# Patient Record
Sex: MALE | Race: WHITE | NOT HISPANIC OR LATINO | Employment: OTHER | URBAN - METROPOLITAN AREA
[De-identification: names, ages, dates, MRNs, and addresses within clinical notes are randomized per-mention and may not be internally consistent; named-entity substitution may affect disease eponyms.]

---

## 2022-11-24 ENCOUNTER — HOSPITAL ENCOUNTER (EMERGENCY)
Facility: HOSPITAL | Age: 67
Discharge: HOME | End: 2022-11-24
Attending: EMERGENCY MEDICINE
Payer: MEDICARE

## 2022-11-24 VITALS
SYSTOLIC BLOOD PRESSURE: 171 MMHG | RESPIRATION RATE: 19 BRPM | HEART RATE: 72 BPM | OXYGEN SATURATION: 95 % | WEIGHT: 249 LBS | TEMPERATURE: 98 F | HEIGHT: 65 IN | DIASTOLIC BLOOD PRESSURE: 81 MMHG | BODY MASS INDEX: 41.48 KG/M2

## 2022-11-24 DIAGNOSIS — S00.03XA CONTUSION OF SCALP, INITIAL ENCOUNTER: ICD-10-CM
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
## 2022-11-24 DIAGNOSIS — S01.311A LACERATION OF AURICLE OF RIGHT EAR, INITIAL ENCOUNTER: ICD-10-CM

## 2022-11-24 PROCEDURE — 90471 IMMUNIZATION ADMIN: CPT | Performed by: EMERGENCY MEDICINE

## 2022-11-24 PROCEDURE — 99285 EMERGENCY DEPT VISIT HI MDM: CPT | Mod: 25

## 2022-11-24 PROCEDURE — 63600175 PHARM REV CODE 636 W HCPCS: Performed by: EMERGENCY MEDICINE

## 2022-11-24 PROCEDURE — 90715 TDAP VACCINE 7 YRS/> IM: CPT | Performed by: EMERGENCY MEDICINE

## 2022-11-24 RX ADMIN — TETANUS TOXOID, REDUCED DIPHTHERIA TOXOID AND ACELLULAR PERTUSSIS VACCINE, ADSORBED 0.5 ML: 5; 2.5; 8; 8; 2.5 SUSPENSION INTRAMUSCULAR at 01:11

## 2022-11-24 NOTE — ED PROVIDER NOTES
"Encounter Date: 11/23/2022    SCRIBE #1 NOTE: IMary, am scribing for, and in the presence of,  Scott Ferris MD. I have scribed the following portions of the note - Other sections scribed: HPI, ROS, PE.   SCRIBE #2 NOTE: I, Criselda Osborn, am scribing for, and in the presence of,  Scott Ferris MD. I have scribed the following portions of the note - Other sections scribed: HPI, ROS, PE.   History     Chief Complaint   Patient presents with    Fall     Pt presents to ED c/o fall that occurred tonight around 2140.  Pt reports hitting his head and shattering a glass table.  Pt c/o neck pain, headache and laceration to the right ear.  Pt reports taking blood thinner. Denies loc, weakness, dizziness, n/v or any other symptoms.  Pain 5/10.       66 y.o male patient with no pertinent PMHx, presents to the ED with a chief complaint of fall onset about four hours ago. Patient states that he fell and hit his head. He further states that he shattered a glass table secondary to the fall and has "cuts and glass all over". Patient reports having a "knot behind his right ear" and pain and "red marks" to his buttocks. Denies any LOC. He notes that he is not UTD with his tetanus vaccination. No other exacerbating or alleviating factors. Denies any abdominal pain or other associated symptoms.     The history is provided by the patient. No  was used.   Review of patient's allergies indicates:  No Known Allergies  No past medical history on file.  No past surgical history on file.  No family history on file.     Review of Systems   Constitutional: Negative.    HENT: Negative.          (+) "Knot" behind right ear     Eyes: Negative.    Respiratory: Negative.     Cardiovascular: Negative.    Gastrointestinal: Negative.  Negative for abdominal pain.   Endocrine: Negative.    Genitourinary: Negative.    Musculoskeletal:  Positive for myalgias.   Skin:  Positive for wound.   Allergic/Immunologic: " Negative.    Neurological:  Negative for syncope.   Hematological: Negative.    Psychiatric/Behavioral: Negative.       Physical Exam     Initial Vitals [11/24/22 0000]   BP Pulse Resp Temp SpO2   (!) 172/97 70 18 98.4 °F (36.9 °C) 95 %      MAP       --         Physical Exam    Nursing note and vitals reviewed.  Constitutional: Vital signs are normal. He appears well-developed. He is active and cooperative.   HENT:   Head: Normocephalic and atraumatic.   Right Ear: Tympanic membrane normal. Right ear exhibits lacerations (Present to the right external ear canal at the 11 o' clock position). A foreign body (Slither of glass present inside ear canal) is present.   Dry blood present to right ear canal.    Eyes: Conjunctivae, EOM and lids are normal. Pupils are equal, round, and reactive to light.   Neck: Trachea normal. Neck supple. No thyroid mass present.    Full passive range of motion without pain.     Cardiovascular:  Normal rate, regular rhythm, S1 normal, S2 normal, normal heart sounds, intact distal pulses and normal pulses.           Abdominal: Abdomen is soft. Bowel sounds are normal.   Musculoskeletal:         General: Normal range of motion.      Cervical back: Full passive range of motion without pain and neck supple.     Lymphadenopathy:     He has no axillary adenopathy.   Neurological: He is alert and oriented to person, place, and time. He has normal strength. He displays normal reflexes. No cranial nerve deficit or sensory deficit. GCS eye subscore is 4. GCS verbal subscore is 5. GCS motor subscore is 6.   Skin: Skin is warm and dry. Abrasion (Superficial abrasion present to lower chest wall) noted.   Psychiatric: He has a normal mood and affect. His speech is normal and behavior is normal. Judgment and thought content normal. Cognition and memory are normal.       ED Course   Procedures  Labs Reviewed - No data to display       Imaging Results              CT Head Without Contrast (Final result)   Result time 11/24/22 01:04:21      Final result by Katherine Grayson MD (11/24/22 01:04:21)                   Impression:      No acute intracranial hemorrhage detected.  Remote right subinsular infarct.  Age-indeterminate right thalamic lacunar infarct.      Electronically signed by: Katherine Grayson  Date:    11/24/2022  Time:    01:04               Narrative:    EXAMINATION:  CT OF THE HEAD WITHOUT    CLINICAL HISTORY:  Facial trauma, blunt;    TECHNIQUE:  5 mm unenhanced axial images were obtained from the skull base to the vertex.    COMPARISON:  None.    FINDINGS:  The ventricles, basal cisterns, and cortical sulci are within normal limits for patient's stated age. There is no acute intracranial hemorrhage, territorial infarct or mass effect, or midline shift.  There is remote right subinsular infarct.  Associated ex vacuole dilatation is exerted on the right lateral ventricle.  There is also an age-indeterminate right thalamic lacunar infarct.  The visualized paranasal sinuses and mastoid air cells are clear.                                       Medications   Tdap (BOOSTRIX) vaccine injection 0.5 mL (0.5 mLs Intramuscular Given 11/24/22 0108)     Medical Decision Making:   History:   Old Medical Records: I decided to obtain old medical records.  Clinical Tests:   Radiological Study: Ordered and Reviewed        Scribe Attestation:   Scribe #1: I performed the above scribed service and the documentation accurately describes the services I performed. I attest to the accuracy of the note.  Scribe #2: I performed the above scribed service and the documentation accurately describes the services I performed. I attest to the accuracy of the note.                   Clinical Impression:   Final diagnoses:  [W19.XXXA] Fall, initial encounter (Primary)  [S00.03XA] Contusion of scalp, initial encounter  [S01.311A] Laceration of auricle of right ear, initial encounter   Scribe attestation: I, Scott Ferris MD ,  personally performed the services described in this documentation. All medical record entries made by the scribe were at my direction and in my presence.  I have reviewed the chart and agree that the record reflects my personal performance and is accurate and complete.      ED Disposition Condition    Discharge Stable          ED Prescriptions    None       Follow-up Information       Follow up With Specialties Details Why Contact Info      Schedule an appointment as soon as possible for a visit  As needed              Scott Ferris MD  11/24/22 1847

## 2022-12-20 ENCOUNTER — HOSPITAL ENCOUNTER (EMERGENCY)
Facility: HOSPITAL | Age: 67
Discharge: HOME | End: 2022-12-20
Attending: EMERGENCY MEDICINE
Payer: MEDICARE

## 2022-12-20 VITALS
TEMPERATURE: 99 F | OXYGEN SATURATION: 95 % | BODY MASS INDEX: 41.48 KG/M2 | SYSTOLIC BLOOD PRESSURE: 166 MMHG | HEIGHT: 65 IN | DIASTOLIC BLOOD PRESSURE: 79 MMHG | HEART RATE: 85 BPM | WEIGHT: 249 LBS | RESPIRATION RATE: 27 BRPM

## 2022-12-20 DIAGNOSIS — R05.3 CHRONIC COUGH: ICD-10-CM

## 2022-12-20 DIAGNOSIS — R05.9 COUGH: ICD-10-CM

## 2022-12-20 DIAGNOSIS — J40 BRONCHITIS: Primary | ICD-10-CM

## 2022-12-20 LAB
ALBUMIN SERPL BCP-MCNC: 4.1 G/DL (ref 3.5–5.2)
ALP SERPL-CCNC: 84 U/L (ref 55–135)
ALT SERPL W/O P-5'-P-CCNC: 27 U/L (ref 10–44)
ANION GAP SERPL CALC-SCNC: 10 MMOL/L (ref 8–16)
AST SERPL-CCNC: 19 U/L (ref 10–40)
BASOPHILS # BLD AUTO: 0.05 K/UL (ref 0–0.2)
BASOPHILS NFR BLD: 0.4 % (ref 0–1.9)
BILIRUB SERPL-MCNC: 1.2 MG/DL (ref 0.1–1)
BNP SERPL-MCNC: 25 PG/ML (ref 0–99)
BUN SERPL-MCNC: 8 MG/DL (ref 8–23)
CALCIUM SERPL-MCNC: 9 MG/DL (ref 8.7–10.5)
CHLORIDE SERPL-SCNC: 104 MMOL/L (ref 95–110)
CO2 SERPL-SCNC: 23 MMOL/L (ref 23–29)
CREAT SERPL-MCNC: 0.8 MG/DL (ref 0.5–1.4)
CTP QC/QA: YES
CTP QC/QA: YES
DIFFERENTIAL METHOD: ABNORMAL
EOSINOPHIL # BLD AUTO: 0.3 K/UL (ref 0–0.5)
EOSINOPHIL NFR BLD: 2.1 % (ref 0–8)
ERYTHROCYTE [DISTWIDTH] IN BLOOD BY AUTOMATED COUNT: 12.8 % (ref 11.5–14.5)
EST. GFR  (NO RACE VARIABLE): >60 ML/MIN/1.73 M^2
GLUCOSE SERPL-MCNC: 126 MG/DL (ref 70–110)
HCT VFR BLD AUTO: 42 % (ref 40–54)
HGB BLD-MCNC: 14.4 G/DL (ref 14–18)
IMM GRANULOCYTES # BLD AUTO: 0.05 K/UL (ref 0–0.04)
IMM GRANULOCYTES NFR BLD AUTO: 0.4 % (ref 0–0.5)
LYMPHOCYTES # BLD AUTO: 1.2 K/UL (ref 1–4.8)
LYMPHOCYTES NFR BLD: 8.6 % (ref 18–48)
MCH RBC QN AUTO: 31.1 PG (ref 27–31)
MCHC RBC AUTO-ENTMCNC: 34.3 G/DL (ref 32–36)
MCV RBC AUTO: 91 FL (ref 82–98)
MONOCYTES # BLD AUTO: 1.3 K/UL (ref 0.3–1)
MONOCYTES NFR BLD: 9.7 % (ref 4–15)
NEUTROPHILS # BLD AUTO: 10.6 K/UL (ref 1.8–7.7)
NEUTROPHILS NFR BLD: 78.8 % (ref 38–73)
NRBC BLD-RTO: 0 /100 WBC
PLATELET # BLD AUTO: 227 K/UL (ref 150–450)
PMV BLD AUTO: 10.3 FL (ref 9.2–12.9)
POC MOLECULAR INFLUENZA A AGN: NEGATIVE
POC MOLECULAR INFLUENZA B AGN: NEGATIVE
POTASSIUM SERPL-SCNC: 3.8 MMOL/L (ref 3.5–5.1)
PROT SERPL-MCNC: 7.4 G/DL (ref 6–8.4)
RBC # BLD AUTO: 4.63 M/UL (ref 4.6–6.2)
SARS-COV-2 RDRP RESP QL NAA+PROBE: NEGATIVE
SODIUM SERPL-SCNC: 137 MMOL/L (ref 136–145)
TROPONIN I SERPL DL<=0.01 NG/ML-MCNC: <0.006 NG/ML (ref 0–0.03)
WBC # BLD AUTO: 13.47 K/UL (ref 3.9–12.7)

## 2022-12-20 PROCEDURE — 99285 EMERGENCY DEPT VISIT HI MDM: CPT | Mod: 25

## 2022-12-20 PROCEDURE — 87502 INFLUENZA DNA AMP PROBE: CPT

## 2022-12-20 PROCEDURE — 84484 ASSAY OF TROPONIN QUANT: CPT | Performed by: EMERGENCY MEDICINE

## 2022-12-20 PROCEDURE — 93010 ELECTROCARDIOGRAM REPORT: CPT | Mod: ,,, | Performed by: INTERNAL MEDICINE

## 2022-12-20 PROCEDURE — 93010 EKG 12-LEAD: ICD-10-PCS | Mod: ,,, | Performed by: INTERNAL MEDICINE

## 2022-12-20 PROCEDURE — 85025 COMPLETE CBC W/AUTO DIFF WBC: CPT | Performed by: EMERGENCY MEDICINE

## 2022-12-20 PROCEDURE — 87635 SARS-COV-2 COVID-19 AMP PRB: CPT | Performed by: EMERGENCY MEDICINE

## 2022-12-20 PROCEDURE — 80053 COMPREHEN METABOLIC PANEL: CPT | Performed by: EMERGENCY MEDICINE

## 2022-12-20 PROCEDURE — 25000003 PHARM REV CODE 250: Performed by: EMERGENCY MEDICINE

## 2022-12-20 PROCEDURE — 96374 THER/PROPH/DIAG INJ IV PUSH: CPT

## 2022-12-20 PROCEDURE — 93005 ELECTROCARDIOGRAM TRACING: CPT

## 2022-12-20 PROCEDURE — 25500020 PHARM REV CODE 255: Performed by: EMERGENCY MEDICINE

## 2022-12-20 PROCEDURE — 83880 ASSAY OF NATRIURETIC PEPTIDE: CPT | Performed by: EMERGENCY MEDICINE

## 2022-12-20 RX ORDER — BENZONATATE 100 MG/1
200 CAPSULE ORAL ONCE
Status: COMPLETED | OUTPATIENT
Start: 2022-12-20 | End: 2022-12-20

## 2022-12-20 RX ORDER — LIDOCAINE HYDROCHLORIDE 20 MG/ML
15 SOLUTION OROPHARYNGEAL ONCE
Status: COMPLETED | OUTPATIENT
Start: 2022-12-20 | End: 2022-12-20

## 2022-12-20 RX ORDER — GUAIFENESIN 100 MG/5ML
100-200 SOLUTION ORAL EVERY 4 HOURS PRN
Qty: 60 ML | Refills: 0 | Status: SHIPPED | OUTPATIENT
Start: 2022-12-20 | End: 2022-12-30

## 2022-12-20 RX ORDER — PREDNISONE 50 MG/1
50 TABLET ORAL DAILY
Qty: 5 TABLET | Refills: 0 | Status: SHIPPED | OUTPATIENT
Start: 2022-12-20 | End: 2022-12-25

## 2022-12-20 RX ORDER — ALBUTEROL SULFATE 90 UG/1
1-2 AEROSOL, METERED RESPIRATORY (INHALATION) EVERY 6 HOURS PRN
Qty: 18 G | Refills: 0 | Status: SHIPPED | OUTPATIENT
Start: 2022-12-20 | End: 2023-12-20

## 2022-12-20 RX ORDER — METOPROLOL TARTRATE 1 MG/ML
5 INJECTION, SOLUTION INTRAVENOUS
Status: COMPLETED | OUTPATIENT
Start: 2022-12-20 | End: 2022-12-20

## 2022-12-20 RX ORDER — CETIRIZINE HYDROCHLORIDE 10 MG/1
10 TABLET ORAL DAILY
Qty: 30 TABLET | Refills: 0 | Status: SHIPPED | OUTPATIENT
Start: 2022-12-20 | End: 2023-12-20

## 2022-12-20 RX ORDER — MAG HYDROX/ALUMINUM HYD/SIMETH 200-200-20
30 SUSPENSION, ORAL (FINAL DOSE FORM) ORAL ONCE
Status: COMPLETED | OUTPATIENT
Start: 2022-12-20 | End: 2022-12-20

## 2022-12-20 RX ORDER — AMOXICILLIN AND CLAVULANATE POTASSIUM 875; 125 MG/1; MG/1
1 TABLET, FILM COATED ORAL 2 TIMES DAILY
Qty: 20 TABLET | Refills: 0 | Status: SHIPPED | OUTPATIENT
Start: 2022-12-20

## 2022-12-20 RX ORDER — FAMOTIDINE 20 MG/1
20 TABLET, FILM COATED ORAL 2 TIMES DAILY
Qty: 60 TABLET | Refills: 0 | Status: SHIPPED | OUTPATIENT
Start: 2022-12-20 | End: 2023-12-20

## 2022-12-20 RX ADMIN — LIDOCAINE HYDROCHLORIDE 15 ML: 20 SOLUTION ORAL at 09:12

## 2022-12-20 RX ADMIN — BENZONATATE 200 MG: 100 CAPSULE ORAL at 09:12

## 2022-12-20 RX ADMIN — IOHEXOL 85 ML: 350 INJECTION, SOLUTION INTRAVENOUS at 11:12

## 2022-12-20 RX ADMIN — ALUMINUM HYDROXIDE, MAGNESIUM HYDROXIDE, AND DIMETHICONE 30 ML: 200; 20; 200 SUSPENSION ORAL at 09:12

## 2022-12-20 RX ADMIN — METOPROLOL TARTRATE 5 MG: 1 INJECTION, SOLUTION INTRAVENOUS at 10:12

## 2022-12-20 NOTE — ED TRIAGE NOTES
"Patient reports cough and general fatigue for 6 weeks. Took dose of Doxycycline 3 weeks ago with no improvement. "Barking" cough noted. Placed on cardiac monitor.   "

## 2022-12-20 NOTE — DISCHARGE INSTRUCTIONS
Thank you for coming to our Emergency Department today. It is important to remember that some problems or medical conditions are difficult to diagnose and may not be found or addressed during your Emergency Department visit.     Be sure to follow up with your primary care doctor and review all labs/imaging/tests that were performed during your ER visit with them. Some labs/imaging/tests may be outside of the normal range, and require non-emergent follow-up and/or further investigation/treatment/procedures/testing to help diagnose/exclude/prevent complications or other potentially serious medical conditions that were not discussed or addressed during your ER visit.    If you do not have a primary care doctor, you may contact the one listed on your discharge paperwork or you may also call the [facility] Appointment Desk at [phone] to schedule an appointment and establish care with one. It is important to your health that you have a primary care doctor.    Please take all medications as directed. All medications may potentially have side-effects and it is impossible to predict which medications may give you side-effects or what side-effects (if any) they will give you.. If you feel that you are having a negative effect or side-effect of any medication you should immediately stop taking them and seek medical attention. If you feel that you are having a life-threatening reaction call 911.    Return to the ER with any questions/concerns, new/concerning symptoms, worsening or failure to improve.     Do not drive, swim, climb to height, take a bath, operate heavy machinery, drink alcohol or take potentially sedating medications, sign any legal documents or make any important decisions for 24 hours if you have received any pain medications, sedatives or mood altering drugs during your ER visit or within 24 hours of taking them if they have been prescribed to you.     You can find additional resources for Dentists,  hearing aids, durable medical equipment, low cost pharmacies and other resources at https://[facility].org    BELOW THIS LINE ONLY APPLIES IF YOU HAVE A COVID TEST PENDING OR IF YOU HAVE BEEN DIAGNOSED WITH COVID:  Please access [facility] to review the results of your test. Until the results of your COVID test return, you should isolate yourself so as not to potentially spread illness to others.   If your COVID test returns positive, you should isolate yourself so as not to spread illness to others. After five full days, if you are feeling better and you have not had fever for 24 hours, you can return to your typical daily activities, but you must wear a mask around others for an additional 5 days.   If your COVID test returns negative and you are either unvaccinated or more than six months out from your two-dose vaccine and are not yet boosted, you should still quarantine for 5 full days followed by strict mask use for an additional 5 full days.   If your COVID test returns negative and you have received your 2-dose initial vaccine as well as a booster, you should continue strict mask use for 10 full days after the exposure.  For all those exposed, best practice includes a test at day 5 after the exposure. This can be a home test or a test through one of the many testing centers throughout our community.   Masking is always advised to limit the spread of COVID. Cdc.gov is an excellent site to obtain the latest up to date recommendations regarding COVID and COVID testing.     CDC Testing and Quarantine Guidelines for patients with exposure to a known-positive COVID-19 person:  A close exposure is defined as anyone who has had an exposure (masked or unmasked) to a known COVID -19 positive person within 6 feet of someone for a cumulative total of 15 minutes or more over a 24-hour period.   Vaccinated and/or if you recently had a positive covid test within 90 days do NOT need to quarantine after contact with someone  who had COVID-19 unless you develop symptoms.   Fully vaccinated people who have not had a positive test within 90 days, should get tested 3-5 days after their exposure, even if they don't have symptoms and wear a mask indoors in public for 14 days following exposure or until their test result is negative.      Unvaccinated and/or NOT had a positive test within 90 days and meet close exposure  You are required by CDC guidelines to quarantine for at least 5 days from time of exposure followed by 5 days of strict masking. It is recommended, but not required to test after 5 days, unless you develop symptoms, in which case you should test at that time.  If you get tested after 5 days and your test is positive, your 5 day period of isolation starts the day of the positive test.    If your exposure does not meet the above definition, you can return to your normal daily activities to include social distancing, wearing a mask and frequent handwashing.      Here is a link to guidance from the CDC:  https://www.cdc.gov/media/releases/2021/s1227-isolation-quarantine-guidance.html      [facility] Testing Sites:  https://ldh.[place].gov/page/3934      [facility] website with testing locations and guidance:  [email or web address]

## 2022-12-20 NOTE — ED PROVIDER NOTES
Encounter Date: 12/20/2022       History     Chief Complaint   Patient presents with    Cough     Pt reports cough x 6 weeks. Reports being seen at urgent care 3 weeks ago and giving antibiotics and cough medications but has not gotten better. Reports clear sputum. Reports sob when coughing.      67 y.o. male Past Medical History:  No date: Hypertension  No date: Stroke     Endorses cough prod yellow sputum x 6 weeks states he was treated with 7 days doxy from an urgent ca. Denies hx of smoking, endorses some post nasal drip and reflux. Denies sob other than after extended coughing fits. No f/c, n/v, diarrhea/dysuria.    Review of patient's allergies indicates:   Allergen Reactions    Aspirin      Nose bleed     Past Medical History:   Diagnosis Date    Hypertension     Stroke      History reviewed. No pertinent surgical history.  History reviewed. No pertinent family history.  Social History     Tobacco Use    Smoking status: Former     Types: Cigarettes    Smokeless tobacco: Never   Substance Use Topics    Alcohol use: Yes     Comment: occasionally    Drug use: Never     Review of Systems   Constitutional:  Negative for fever.   HENT:  Negative for sore throat.    Respiratory:  Negative for shortness of breath.    Cardiovascular:  Negative for chest pain.   Gastrointestinal:  Negative for nausea.   Genitourinary:  Negative for dysuria.   Musculoskeletal:  Negative for back pain.   Skin:  Negative for rash.   Neurological:  Negative for weakness.   Hematological:  Does not bruise/bleed easily.   All other systems reviewed and are negative.    Physical Exam     Initial Vitals [12/20/22 0923]   BP Pulse Resp Temp SpO2   (!) 198/115 (!) 127 (!) 22 98.5 °F (36.9 °C) 95 %      MAP       --         Physical Exam    Nursing note and vitals reviewed.  Constitutional: He appears well-developed and well-nourished.   HENT:   Head: Normocephalic and atraumatic.   Eyes: EOM are normal. Pupils are equal, round, and reactive to  light.   Cardiovascular:  Normal rate and regular rhythm.           Pulmonary/Chest: Effort normal and breath sounds normal. No respiratory distress. He has no wheezes. He has no rhonchi. He has no rales.   Abdominal: He exhibits no distension.   Musculoskeletal:         General: No tenderness or edema.     Neurological: He is alert and oriented to person, place, and time.   Skin: Skin is warm and dry.   Psychiatric: He has a normal mood and affect.   No LE swelling, no jvd      ED Course   Procedures  Labs Reviewed   CBC W/ AUTO DIFFERENTIAL - Abnormal; Notable for the following components:       Result Value    WBC 13.47 (*)     MCH 31.1 (*)     Gran # (ANC) 10.6 (*)     Immature Grans (Abs) 0.05 (*)     Mono # 1.3 (*)     Gran % 78.8 (*)     Lymph % 8.6 (*)     All other components within normal limits   COMPREHENSIVE METABOLIC PANEL - Abnormal; Notable for the following components:    Glucose 126 (*)     Total Bilirubin 1.2 (*)     All other components within normal limits   B-TYPE NATRIURETIC PEPTIDE   TROPONIN I   SARS-COV-2 RDRP GENE   POCT INFLUENZA A/B MOLECULAR          Imaging Results              CT Chest With Contrast (Final result)  Result time 12/20/22 12:13:34      Final result by Lyle Aguero III, MD (12/20/22 12:13:34)                   Impression:      Small pulmonary micro nodules most likely post infectious postinflammatory.  12 month follow-up is recommended only if there is a moderate to high risk for developing lung cancer.    Coronary artery calcifications.    Cholelithiasis.    Left adrenal adenoma.    Left upper pole renal lesion most likely a cyst.      Electronically signed by: Lyle Aguero MD  Date:    12/20/2022  Time:    12:13               Narrative:    EXAMINATION:  CT CHEST WITH CONTRAST    CLINICAL HISTORY:  Cough, persistent;    FINDINGS:  Patient was administered 75 cc of Omnipaque 350 intravenously.    There are coronary calcifications.  No mediastinal, hilar, or axillary  adenopathy is seen.  There is fatty change of the liver.  There is cholelithiasis.  There is a left adrenal adenoma measuring 2 cm.  There is left upper pole renal lesion most likely a cyst.  Is the trachea and bronchi are patent.  There is no evidence of bronchiectasis, emphysema, interstitial lung disease, or airway trapping.  There are few tiny micro nodules bilaterally.  Largest is right middle lobe 4 mm.  Has a small focus of scar in the left lingula.  The bones reveal DJD and dish.                                       X-Ray Chest 1 View (Final result)  Result time 12/20/22 10:18:43   Procedure changed from X-Ray Chest PA And Lateral     Final result by Lyle Aguero III, MD (12/20/22 10:18:43)                   Impression:      No acute process seen.      Electronically signed by: Lyle Aguero MD  Date:    12/20/2022  Time:    10:18               Narrative:    EXAMINATION:  XR CHEST 1 VIEW    CLINICAL HISTORY:  cough;  Chronic cough    FINDINGS:  Chest one view: Heart size is normal.  Lungs are clear.  The bones show nothing unusual.                                       Medications   metoprolol injection 5 mg (5 mg Intravenous Given 12/20/22 1004)   aluminum-magnesium hydroxide-simethicone 200-200-20 mg/5 mL suspension 30 mL (30 mLs Oral Given 12/20/22 0958)     And   LIDOcaine HCl 2% oral solution 15 mL (15 mLs Oral Given 12/20/22 0958)   benzonatate capsule 200 mg (200 mg Oral Given 12/20/22 0958)   iohexoL (OMNIPAQUE 350) injection 85 mL (85 mLs Intravenous Given 12/20/22 1150)   Pt did not take B blockers today.                    Screening labs, cxr, steroids, abx, treat reflux and post nasal drip.     Ct normal, will discharge with albuterol prednisone Pepcid Zyrtec and Mucinex  Clinical Impression:   Final diagnoses:  [R05.9] Cough  [R05.3] Chronic cough  [R05.3] Chronic cough - 6 weeks  [J40] Bronchitis (Primary)        ED Disposition Condition    Discharge Stable          ED Prescriptions        Medication Sig Dispense Start Date End Date Auth. Provider    albuterol (PROVENTIL/VENTOLIN HFA) 90 mcg/actuation inhaler Inhale 1-2 puffs into the lungs every 6 (six) hours as needed (coughing). Rescue 18 g 12/20/2022 12/20/2023 Della Sutherland MD    predniSONE (DELTASONE) 50 MG Tab Take 1 tablet (50 mg total) by mouth once daily. for 5 days 5 tablet 12/20/2022 12/25/2022 Della Sutherland MD    cetirizine (ZYRTEC) 10 MG tablet Take 1 tablet (10 mg total) by mouth once daily. 30 tablet 12/20/2022 12/20/2023 Della Sutherland MD    guaiFENesin 100 mg/5 ml (ROBITUSSIN) 100 mg/5 mL syrup Take 5-10 mLs (100-200 mg total) by mouth every 4 (four) hours as needed for Cough. 60 mL 12/20/2022 12/30/2022 Della Sutherland MD    amoxicillin-clavulanate 875-125mg (AUGMENTIN) 875-125 mg per tablet Take 1 tablet by mouth 2 (two) times daily. 20 tablet 12/20/2022 -- Della Sutherland MD    famotidine (PEPCID) 20 MG tablet Take 1 tablet (20 mg total) by mouth 2 (two) times daily. 60 tablet 12/20/2022 12/20/2023 Della Sutherland MD          Follow-up Information       Follow up With Specialties Details Why Contact Info    [facility]    [address] [place] [address]  [phone]               Della Sutherland MD  12/20/22 [id number]

## 2022-12-21 ENCOUNTER — HOSPITAL ENCOUNTER (EMERGENCY)
Facility: HOSPITAL | Age: 67
Discharge: HOME | End: 2022-12-21
Attending: INTERNAL MEDICINE
Payer: MEDICARE

## 2022-12-21 VITALS
TEMPERATURE: 99 F | HEIGHT: 65 IN | RESPIRATION RATE: 14 BRPM | OXYGEN SATURATION: 96 % | SYSTOLIC BLOOD PRESSURE: 135 MMHG | WEIGHT: 248.88 LBS | BODY MASS INDEX: 41.47 KG/M2 | HEART RATE: 72 BPM | DIASTOLIC BLOOD PRESSURE: 78 MMHG

## 2022-12-21 DIAGNOSIS — R05.3 CHRONIC COUGH: Primary | ICD-10-CM

## 2022-12-21 PROCEDURE — 25000003 PHARM REV CODE 250: Performed by: INTERNAL MEDICINE

## 2022-12-21 PROCEDURE — 96374 THER/PROPH/DIAG INJ IV PUSH: CPT

## 2022-12-21 PROCEDURE — 99284 EMERGENCY DEPT VISIT MOD MDM: CPT | Mod: 25

## 2022-12-21 RX ORDER — FAMOTIDINE 10 MG/ML
20 INJECTION INTRAVENOUS
Status: COMPLETED | OUTPATIENT
Start: 2022-12-21 | End: 2022-12-21

## 2022-12-21 RX ORDER — AZELASTINE 1 MG/ML
2 SPRAY, METERED NASAL 2 TIMES DAILY
Qty: 30 ML | Refills: 0 | Status: SHIPPED | OUTPATIENT
Start: 2022-12-21 | End: 2023-02-14

## 2022-12-21 RX ORDER — FLUTICASONE PROPIONATE 50 MCG
2 SPRAY, SUSPENSION (ML) NASAL DAILY
Qty: 15 G | Refills: 0 | Status: SHIPPED | OUTPATIENT
Start: 2022-12-21

## 2022-12-21 RX ORDER — BENZONATATE 100 MG/1
200 CAPSULE ORAL ONCE
Status: COMPLETED | OUTPATIENT
Start: 2022-12-21 | End: 2022-12-21

## 2022-12-21 RX ORDER — BENZONATATE 100 MG/1
200 CAPSULE ORAL ONCE
Status: DISCONTINUED | OUTPATIENT
Start: 2022-12-21 | End: 2022-12-21

## 2022-12-21 RX ADMIN — BENZONATATE 200 MG: 100 CAPSULE ORAL at 03:12

## 2022-12-21 RX ADMIN — FAMOTIDINE 20 MG: 10 INJECTION INTRAVENOUS at 04:12

## 2022-12-21 NOTE — ED PROVIDER NOTES
"Encounter Date: 12/21/2022    SCRIBE #1 NOTE: I, Mi Diallo, am scribing for, and in the presence of,  Chuck Mckenzie MD. I have scribed the following portions of the note - Other sections scribed: HPI, ROS, PE.     History     Chief Complaint   Patient presents with    Cough    Shortness of Breath     Patient arrives via EMS with a cough that has been ongoing for the past 6 weeks. Patient was seen yesterday for this and was given Rx for antibiotics, which he has started taking. EMS was called because patient woke up SOB, but felt fine when EMS arrived at his house     Alexander Latif is a 67 y.o. male, with a PMHx of HTN and Stroke, who presents to the ED with coughing and SOB this morning. Patient's wife reports the patient woke up at 2 am and couldn't breathe. Patient notes he was coughing to the point he was Sob as he reports, "I feel like something wants to come up". Patient reports he was here yesterday and was diagnosed with bronchitis and sent home with  prescriptions. Patient notes he has been endorsing compliance of the prescription. Patient denies smoking cigarettes, but does own 2 large dog that shed hair, that are possibly contributing to his symptoms. No other exacerbating or alleviating factors. Patient denies any other associated symptoms.     The history is provided by the patient and the spouse. No  was used.   Review of patient's allergies indicates:   Allergen Reactions    Aspirin      Nose bleed     Past Medical History:   Diagnosis Date    Hypertension     Stroke      No past surgical history on file.  No family history on file.  Social History     Tobacco Use    Smoking status: Former     Types: Cigarettes    Smokeless tobacco: Never   Substance Use Topics    Alcohol use: Yes     Comment: occasionally    Drug use: Never     Review of Systems   Constitutional:  Negative for chills and fever.   HENT:  Negative for drooling, facial swelling and trouble swallowing.  "   Eyes:  Negative for redness and visual disturbance.   Respiratory:  Positive for cough and shortness of breath. Negative for stridor.    Cardiovascular:  Negative for chest pain.   Gastrointestinal:  Negative for abdominal pain, nausea and vomiting.   Genitourinary:  Negative for dysuria and hematuria.   Musculoskeletal:  Negative for gait problem and neck stiffness.   Skin:  Negative for pallor and wound.   Neurological:  Negative for syncope, facial asymmetry, speech difficulty and weakness.   Psychiatric/Behavioral:  Negative for confusion.    All other systems reviewed and are negative.    Physical Exam     Initial Vitals [12/21/22 0321]   BP Pulse Resp Temp SpO2   126/80 72 16 98.6 °F (37 °C) 95 %      MAP       --         Physical Exam    Nursing note and vitals reviewed.  Constitutional: He appears well-developed and well-nourished.   HENT:   Head: Normocephalic and atraumatic.   Right Ear: External ear normal.   Left Ear: External ear normal.   Clear post nasal drip, enlarged nasal turbinates.   Eyes: Conjunctivae and EOM are normal. Pupils are equal, round, and reactive to light.   Neck:   Normal range of motion.  Cardiovascular:  Normal rate, regular rhythm and normal heart sounds.           Pulmonary/Chest: Breath sounds normal. No respiratory distress. He has no wheezes. He has no rhonchi. He has no rales. He exhibits no tenderness.   Pulse ox is 94-95% on room air upon ambulation.   Abdominal: Abdomen is soft. Bowel sounds are normal. He exhibits no distension.   Musculoskeletal:         General: Normal range of motion.      Cervical back: Normal range of motion.      Comments: Chronic left-sided muscle weakness. Patient is ambulatroy.     Neurological: He is alert.   Skin: Skin is warm and dry.   Psychiatric: He has a normal mood and affect.       ED Course   Procedures  Labs Reviewed - No data to display       Imaging Results    None          Medications   benzonatate capsule 200 mg (200 mg Oral  "Given 12/21/22 0333)   famotidine (PF) injection 20 mg (20 mg Intravenous Given 12/21/22 0414)     Medical Decision Making:   History:   Old Medical Records: I decided to obtain old medical records.  Initial Assessment:   Alexander Latif is a 67 y.o. male, with a PMHx of HTN and Stroke, who presents to the ED with coughing and SOB this morning. Patient's wife reports the patient woke up at 2 am and couldn't breathe. Patient notes he was coughing to the point he was Sob as he reports, "I feel like something wants to come up". Patient reports he was here yesterday and was diagnosed with bronchitis and sent home with  prescriptions. Patient notes he has been endorsing compliance of the prescription. Patient denies smoking cigarettes, but does own 2 large dog that shed hair, that are possibly contributing to his symptoms. No other exacerbating or alleviating factors. Patient denies any other associated symptoms.     ED Management:  Chest x-ray, CT of chest and lab work were reviewed from previous ED visit less than 24 hours ago.  Patient's vital signs have been stable with normal O2 sats on room air.  Environmental causes for chronic cough were reviewed with the patient, who endorses several long haired dogs living in his house.  Prescriptions for Astelin/fluticasone were given and patient was advised to follow-up with his primary care physician within the next week for re-evaluation/return to the emergency department if condition worsens.        Scribe Attestation:   Scribe #1: I performed the above scribed service and the documentation accurately describes the services I performed. I attest to the accuracy of the note.                   Clinical Impression:   Final diagnoses:  [R05.3] Chronic cough (Primary)        ED Disposition Condition    Discharge Stable        I, Dr. Mckenzie, personally performed the services described in this documentation. All medical record entries made by the scribe were at my direction and in " my presence. I have reviewed the chart and agree that the record reflects my personal performance and is accurate and complete.   ED Prescriptions       Medication Sig Dispense Start Date End Date Auth. Provider    azelastine (ASTELIN) 137 mcg (0.1 %) nasal spray 2 sprays (274 mcg total) by Nasal route 2 (two) times daily. 30 mL 12/21/2022 2/14/2023 Chuck Mckenzie MD    fluticasone propionate (FLONASE) 50 mcg/actuation nasal spray 2 sprays (100 mcg total) by Each Nostril route once daily. 15 g 12/21/2022 -- Chuck Mcknezie MD          Follow-up Information    None          Chuck Mckenzie MD  12/21/22 6770